# Patient Record
Sex: FEMALE | Race: WHITE | URBAN - METROPOLITAN AREA
[De-identification: names, ages, dates, MRNs, and addresses within clinical notes are randomized per-mention and may not be internally consistent; named-entity substitution may affect disease eponyms.]

---

## 2022-01-20 ENCOUNTER — WEB ENCOUNTER (OUTPATIENT)
Dept: URBAN - METROPOLITAN AREA CLINIC 23 | Facility: CLINIC | Age: 32
End: 2022-01-20

## 2022-01-20 ENCOUNTER — DASHBOARD ENCOUNTERS (OUTPATIENT)
Age: 32
End: 2022-01-20

## 2022-01-20 ENCOUNTER — OFFICE VISIT (OUTPATIENT)
Dept: URBAN - METROPOLITAN AREA CLINIC 23 | Facility: CLINIC | Age: 32
End: 2022-01-20
Payer: COMMERCIAL

## 2022-01-20 ENCOUNTER — TELEPHONE ENCOUNTER (OUTPATIENT)
Dept: URBAN - METROPOLITAN AREA CLINIC 49 | Facility: CLINIC | Age: 32
End: 2022-01-20

## 2022-01-20 VITALS
WEIGHT: 209 LBS | TEMPERATURE: 98 F | HEIGHT: 69 IN | BODY MASS INDEX: 30.96 KG/M2 | DIASTOLIC BLOOD PRESSURE: 75 MMHG | SYSTOLIC BLOOD PRESSURE: 118 MMHG | HEART RATE: 86 BPM

## 2022-01-20 DIAGNOSIS — R10.84 ABDOMINAL CRAMPING, GENERALIZED: ICD-10-CM

## 2022-01-20 DIAGNOSIS — R19.5 LOOSE STOOLS: ICD-10-CM

## 2022-01-20 DIAGNOSIS — R63.4 WEIGHT LOSS: ICD-10-CM

## 2022-01-20 DIAGNOSIS — K21.9 GERD: ICD-10-CM

## 2022-01-20 DIAGNOSIS — R19.8 CHANGE IN BOWEL MOVEMENT: ICD-10-CM

## 2022-01-20 DIAGNOSIS — A04.8 H. PYLORI INFECTION: ICD-10-CM

## 2022-01-20 PROBLEM — 235595009 GASTROESOPHAGEAL REFLUX DISEASE: Status: ACTIVE | Noted: 2022-01-20

## 2022-01-20 PROCEDURE — 99244 OFF/OP CNSLTJ NEW/EST MOD 40: CPT | Performed by: INTERNAL MEDICINE

## 2022-01-20 PROCEDURE — 99204 OFFICE O/P NEW MOD 45 MIN: CPT | Performed by: INTERNAL MEDICINE

## 2022-01-20 RX ORDER — PANTOPRAZOLE SODIUM 40 MG/1
1 TABLET TABLET, DELAYED RELEASE ORAL ONCE A DAY
Qty: 90 | Refills: 1 | OUTPATIENT
Start: 2022-01-20

## 2022-01-20 RX ORDER — HYOSCYAMINE SULFATE 0.12 MG/1
PLACE 1 TABLET UNDER TONGUE BY TRANSLINGUAL ROUTE TABLET, ORALLY DISINTEGRATING ORAL
Qty: 90 | Refills: 0 | OUTPATIENT
Start: 2022-01-20

## 2022-01-20 NOTE — HPI-TODAY'S VISIT:
30 yo female presenting for evaluation for variable bowel issues kindly referred by Dr. Isela Barnhart. A copy of this note will be sent to the referring physician -she has had a history of intestinal issues since her teens- had colonoscopy at 25.  she also may have had h pylori at 20 and was treated for that , took about a year to clear -past 6 months worsening symptoms -she has AS which is what she is seeing Dr. Barnhart for - is on Cimzia for the AS, referred here as the bowel issues are not related to AS GERD--she is taking omeprazole 40 mg daily since age 20, used to take 20 and then increased it- without it she wouldn't be able to eat or sleep.  she has heartburn/abdominal pain and discomfort.  with taking the medication she still has breakthrough depending on the foods -ABDOMINAL PAIN- reports it is all lower abdominal discomfort, states that it is sometimes in the upper abdomen.  it is daily.   -VOMITING- three times a week at least, usually after eating.  often nauseous. usually only eating one meal a day at most. before 6 months ago was eating at least 3 meals a day. -was 235 over the summer, now is about 210 - has not been trying to intentioanlly lose weight -BM- some days mucus in the stool, some days normal, other days diarrhea, increased significantly over the past 6 months.  impacting her daily life.  happens both with and without food as well.  denies any blood in the stool -family history - IBS, gerd, unknown about birth mother side of the family -meds- takes immodium and pepto which do help her get through the day as well  -on cimzia- previously has been on humira and mtx and couldn't tolerate -does not notice any improvement in sx when she takes prednisone

## 2022-01-20 NOTE — PHYSICAL EXAM CONSTITUTIONAL:
Called, spoke with Marva. She states patient has cough and sore throat. Made appointment for today at 10:30 with Dr Walker. She verbally states understanding.   well developed, well nourished , in no acute distress , ambulating without difficulty , normal communication ability

## 2022-01-22 LAB
DEAMIDATED GLIADIN ABS, IGA: 7
DEAMIDATED GLIADIN ABS, IGG: 1
ENDOMYSIAL ANTIBODY IGA: NEGATIVE
IMMUNOGLOBULIN A, QN, SERUM: 229
T-TRANSGLUTAMINASE (TTG) IGA: <2
T-TRANSGLUTAMINASE (TTG) IGG: <2

## 2022-01-31 ENCOUNTER — TELEPHONE ENCOUNTER (OUTPATIENT)
Dept: URBAN - METROPOLITAN AREA CLINIC 23 | Facility: CLINIC | Age: 32
End: 2022-01-31

## 2022-02-06 ENCOUNTER — TELEPHONE ENCOUNTER (OUTPATIENT)
Dept: URBAN - METROPOLITAN AREA CLINIC 92 | Facility: CLINIC | Age: 32
End: 2022-02-06

## 2022-02-10 ENCOUNTER — OFFICE VISIT (OUTPATIENT)
Dept: URBAN - METROPOLITAN AREA MEDICAL CENTER 27 | Facility: MEDICAL CENTER | Age: 32
End: 2022-02-10
Payer: COMMERCIAL

## 2022-02-10 DIAGNOSIS — R19.7 ACUTE DIARRHEA: ICD-10-CM

## 2022-02-10 DIAGNOSIS — K29.30 CHRONIC SUPERFICIAL GASTRITIS: ICD-10-CM

## 2022-02-10 PROCEDURE — 45380 COLONOSCOPY AND BIOPSY: CPT | Performed by: INTERNAL MEDICINE

## 2022-02-10 PROCEDURE — 43239 EGD BIOPSY SINGLE/MULTIPLE: CPT | Performed by: INTERNAL MEDICINE

## 2022-02-10 RX ORDER — HYOSCYAMINE SULFATE 0.12 MG/1
PLACE 1 TABLET UNDER TONGUE BY TRANSLINGUAL ROUTE TABLET, ORALLY DISINTEGRATING ORAL
Qty: 90 | Refills: 0 | Status: ACTIVE | COMMUNITY
Start: 2022-01-20

## 2022-02-10 RX ORDER — PANTOPRAZOLE SODIUM 40 MG/1
1 TABLET TABLET, DELAYED RELEASE ORAL ONCE A DAY
Qty: 90 | Refills: 1 | Status: ACTIVE | COMMUNITY
Start: 2022-01-20

## 2022-02-24 ENCOUNTER — WEB ENCOUNTER (OUTPATIENT)
Dept: URBAN - METROPOLITAN AREA CLINIC 92 | Facility: CLINIC | Age: 32
End: 2022-02-24

## 2022-02-24 ENCOUNTER — WEB ENCOUNTER (OUTPATIENT)
Dept: URBAN - METROPOLITAN AREA CLINIC 23 | Facility: CLINIC | Age: 32
End: 2022-02-24

## 2022-02-24 PROBLEM — 816160009 WEIGHT LOSS: Status: ACTIVE | Noted: 2022-01-20

## 2022-02-24 PROBLEM — 88111009: Status: ACTIVE | Noted: 2022-01-20

## 2022-02-24 PROBLEM — 398032003 LOOSE STOOLS: Status: ACTIVE | Noted: 2022-01-20

## 2022-03-14 ENCOUNTER — TELEPHONE ENCOUNTER (OUTPATIENT)
Dept: URBAN - METROPOLITAN AREA CLINIC 92 | Facility: CLINIC | Age: 32
End: 2022-03-14